# Patient Record
(demographics unavailable — no encounter records)

---

## 2025-01-15 NOTE — ASSESSMENT
[FreeTextEntry1] : 58 M  with neck pain and spasm BLUE radiculopathy  restart PT FU 8 weeks Discussed WAI patient deferred

## 2025-01-15 NOTE — DATA REVIEWED
[MRI] : MRI [Lumbar Spine] : lumbar spine [I independently reviewed and interpreted images and report] : I independently reviewed and interpreted images and report [FreeTextEntry1] : Multilevel degen changes with foraminal stenosis worst at C5-6 and C6-7.  Images in [facility]. Full report in chart

## 2025-01-15 NOTE — IMAGING
[Facet arthropathy] : Facet arthropathy [Disc space narrowing] : Disc space narrowing [No instability seen on flexion/extension] : No instability seen on flexion/extension

## 2025-01-15 NOTE — HISTORY OF PRESENT ILLNESS
[Sudden] : sudden [6] : 6 [Dull/Aching] : dull/aching [Ice] : ice [Full time] : Work status: full time [de-identified] : WC DOI: 4/30/24 ([employer])  1/15/25: here to follow up on neck. He reports that he was improving in pain while completing PT, but his script ran out and wants to complete more. has been back at work since 8/25/24.  07/22/24: MRI cervical spine results. OOW since last visit.  5/29/24: 59yo RHD male presents with complaints of neck pain, stiffness and BUE numbness that began after trip and fall at work. Patient reports intermittent numbness in digits 4 and 5. Reports landing on his right shoulder, left elbow and knee after tripping on bolts. He reports hand numbness with driving, interrupts sleep. Taking motrin prn. Denies Change in dexterity or fine motor skills.  Patient has been evaluated by Dr. Tyler for c/o right shoulder and knee pain,  MRI of R shoulder completed showing: . Full-thickness tear in the anterior leading edge of the supraspinatus tendon spanning 8 mm in AP dimension at the footprint. Partial-thickness articular sided tearing in the remainder the supraspinatus tendon extending into the infraspinatus tendon. 2. Intrasubstance tearing in the super aspect of subscapularis tendon. 3. Superior labral tear. 4. Moderate AC joint arthrosis  Patient with Hx of chronic Low back pain and intermittent RLE radicular pain, currently managed by Dr. Beyer pain management with opioids, has had LESI in the past.  PmHx: Chronic pain (Perc 10/325, managed by Dr. 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

## 2025-01-17 NOTE — HISTORY OF PRESENT ILLNESS
[Sudden] : sudden [6] : 6 [Dull/Aching] : dull/aching [Ice] : ice [Full time] : Work status: full time [de-identified] : WC DOI: 4/30/24 ([employer])  5/1/23: 57yo M here with multiple body complaints s/p trip and fall at work. Reports landing on his right shoulder and knee after tripping on bolts. Took motrin and iced with minimal relief.   5.20.24 Here for right shoulder and right knee. her to review MRI from [facility]  8/9/24: here for a post op visit from sx on 7/2/24 - right knee partial medial meniscectomy. Attending PT 2x a week with improvement. ROM is progressing. Saw Dr. Rob on 7/11/24 for 1st post op visit, sutures removed. Currently not working; [employer].   8/23/24: here to follow up on right shoulder/right knee. Had right knee partial medial meniscectomy on 7/2/24. Attending PT with improvement. Doing well with ROM. Some discomfort with bending/kneeling. Minimal pain with shoulder movement. Inquiring about returning to work on 8/25/24; [employer].  1/17/25: here to follow up on right shoulder/right knee. Finished PT. Inquiring about new rx, as it was helping. Continued pain with lifting/extending/prolonged standing. Currently working; [employer]. : Masood Correia.  [] : no [FreeTextEntry1] : right knee,shoulder,back [FreeTextEntry3] : 4/30/24 [FreeTextEntry5] : he tripped over water thing on cement floor. in sling sees PM for low back pain last month so he took percocet  [de-identified] : [employer]  [de-identified] : activity

## 2025-01-17 NOTE — IMAGING
[de-identified] : Elbow Exam:  Inspection: No swelling, no ecchymosis Palpation: Tenderness to palpation over olecranon tip Range of motion: Full elbow flexion, extension, supination, pronation Strength: 5/5 strength in flexion, extension, supination, pronation Special testing: Lateral Elbow pain with resisted wrist extension Stability: No Varus or Valgus instability Motor and sensory intact distally  Shoulder Exam: Inspection: No swelling, no ecchymosis, no deformity, no scapular winging. Palpation: tenderness to palpation over anterior shoulder, trapezius tenderness  Range of motion:  Forward flexion: Active 180 degrees; Passive 180 degrees Abduction: Active 180 degrees; Passive 180 degrees External rotation (with shoulder abducted): Active 90 degrees , Passive 90 degrees Internal rotation (with shoulder abducted): Active 70 degrees, Passive 70 degrees Strength: 5/5 supraspinatus, 5/5 infraspinatus and 5/5 subscapularis. Special testing: Negative Crook test, negative impingement sign, negative Ortiz test, speeds and Yergason negative Motor and sensory intact distally

## 2025-01-17 NOTE — ASSESSMENT
[FreeTextEntry1] : here to follow-up on right shoulder/right knee  Had right knee partial medial meniscectomy on 7/2/24 Progressing well with PT - kindly request uninterrupted PT for shoulder and knee as patient has been improving in ROM and strength  can continue ibuprofen 600mg prn - side effects reviewed  use of cryotherapy discussed  Will administer CSI R shoulder today - tolerated procedure well - post procedure precautions discussed Working full duty RTC 6 weeks   I am working today under the supervision of Dr. Barriga and I am following the plan of care of Dr. Barriga as described by him on this date. Progress note completed by Anna Solomon PA-C under the supervision of Dr. Barriga.

## 2025-06-09 NOTE — WORK
[Was the competent medical cause of the injury] : was the competent medical cause of the injury [Consistent with my objective findings] : consistent with my objective findings [Are consistent with the injury] : are consistent with the injury [Total (100%)] : total (100%)

## 2025-06-09 NOTE — HISTORY OF PRESENT ILLNESS
[Sudden] : sudden [6] : 6 [Dull/Aching] : dull/aching [Ice] : ice [Full time] : Work status: full time [de-identified] : WC DOI: 4/30/24 ([employer])  6/9/25: Here to f/u neck pain. Completed PT since last visit. Continued Numbness of BUE.  Has been going to PT finsihed and dhas been doing HEP with .    1/15/25: here to follow up on neck. He reports that he was improving in pain while completing PT, but his script ran out and wants to complete more. has been back at work since 8/25/24.  07/22/24: MRI cervical spine results. OOW since last visit.  5/29/24: 57yo RHD male presents with complaints of neck pain, stiffness and BUE numbness that began after trip and fall at work. Patient reports intermittent numbness in digits 4 and 5. Reports landing on his right shoulder, left elbow and knee after tripping on bolts. He reports hand numbness with driving, interrupts sleep. Taking motrin prn. Denies Change in dexterity or fine motor skills.  Patient has been evaluated by Dr. Tyler for c/o right shoulder and knee pain,  MRI of R shoulder completed showing: . Full-thickness tear in the anterior leading edge of the supraspinatus tendon spanning 8 mm in AP dimension at the footprint. Partial-thickness articular sided tearing in the remainder the supraspinatus tendon extending into the infraspinatus tendon. 2. Intrasubstance tearing in the super aspect of subscapularis tendon. 3. Superior labral tear. 4. Moderate AC joint arthrosis  Patient with Hx of chronic Low back pain and intermittent RLE radicular pain, currently managed by Dr. Beyer pain management with opioids, has had LESI in the past.  PmHx: Chronic pain (Perc 10/325, managed by Dr. 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

## 2025-06-09 NOTE — HISTORY OF PRESENT ILLNESS
[Sudden] : sudden [6] : 6 [Dull/Aching] : dull/aching [Ice] : ice [Full time] : Work status: full time [de-identified] : WC DOI: 4/30/24 ([employer])  6/9/25: Here to f/u neck pain. Completed PT since last visit. Continued Numbness of BUE.  Has been going to PT finsihed and dhas been doing HEP with .    1/15/25: here to follow up on neck. He reports that he was improving in pain while completing PT, but his script ran out and wants to complete more. has been back at work since 8/25/24.  07/22/24: MRI cervical spine results. OOW since last visit.  5/29/24: 59yo RHD male presents with complaints of neck pain, stiffness and BUE numbness that began after trip and fall at work. Patient reports intermittent numbness in digits 4 and 5. Reports landing on his right shoulder, left elbow and knee after tripping on bolts. He reports hand numbness with driving, interrupts sleep. Taking motrin prn. Denies Change in dexterity or fine motor skills.  Patient has been evaluated by Dr. Tyler for c/o right shoulder and knee pain,  MRI of R shoulder completed showing: . Full-thickness tear in the anterior leading edge of the supraspinatus tendon spanning 8 mm in AP dimension at the footprint. Partial-thickness articular sided tearing in the remainder the supraspinatus tendon extending into the infraspinatus tendon. 2. Intrasubstance tearing in the super aspect of subscapularis tendon. 3. Superior labral tear. 4. Moderate AC joint arthrosis  Patient with Hx of chronic Low back pain and intermittent RLE radicular pain, currently managed by Dr. Beyer pain management with opioids, has had LESI in the past.  PmHx: Chronic pain (Perc 10/325, managed by Dr. 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

## 2025-06-09 NOTE — ASSESSMENT
[FreeTextEntry1] : 58 M  with neck pain and spasm BLUE radiculopathy  persists despite PT .Updated MRI C spine Fu after MRI consider WAI